# Patient Record
Sex: FEMALE | Race: WHITE | NOT HISPANIC OR LATINO | Employment: OTHER | URBAN - METROPOLITAN AREA
[De-identification: names, ages, dates, MRNs, and addresses within clinical notes are randomized per-mention and may not be internally consistent; named-entity substitution may affect disease eponyms.]

---

## 2023-10-27 DIAGNOSIS — I10 PRIMARY HYPERTENSION: Primary | ICD-10-CM

## 2023-10-27 RX ORDER — LISINOPRIL 5 MG/1
5 TABLET ORAL DAILY
COMMUNITY
Start: 2023-07-28 | End: 2024-01-09 | Stop reason: SDUPTHER

## 2023-10-27 RX ORDER — LISINOPRIL 5 MG/1
5 TABLET ORAL DAILY
Qty: 90 TABLET | Refills: 0 | Status: SHIPPED | OUTPATIENT
Start: 2023-10-27 | End: 2024-01-11 | Stop reason: WASHOUT

## 2023-12-31 NOTE — PROGRESS NOTES
Subjective   Patient ID: Samantha Wright is a 83 y.o. female who presents for No chief complaint on file..    ANGELA Castellon is here to follow up on elevated BP.  She has been on hydrochlorothiazide with complaints of urinary frequency and lisinopril with complaints of vomiting.  She was last seen 3 months ago.  She is on no BP meds.  She was instructed in a low salt diet and I asked to get a monitor at home to check her BP weekly.    Review of Systems    Objective   There were no vitals taken for this visit.    Physical Exam    Assessment/Plan   {Assess/PlanSmartLinks:[id number]}

## 2024-01-02 ENCOUNTER — APPOINTMENT (OUTPATIENT)
Dept: PRIMARY CARE | Facility: CLINIC | Age: 84
End: 2024-01-02
Payer: MEDICARE

## 2024-01-09 PROBLEM — F32.A DEPRESSION: Status: ACTIVE | Noted: 2019-02-20

## 2024-01-09 PROBLEM — I10 HYPERTENSION: Status: ACTIVE | Noted: 2023-03-03

## 2024-01-09 PROBLEM — E78.00 HYPERCHOLESTEROLEMIA: Status: ACTIVE | Noted: 2018-11-27

## 2024-01-09 PROBLEM — R73.01 IMPAIRED FASTING BLOOD SUGAR: Status: ACTIVE | Noted: 2020-03-27

## 2024-01-09 RX ORDER — CHOLECALCIFEROL (VITAMIN D3) 25 MCG
1000 TABLET ORAL DAILY
COMMUNITY
Start: 2017-11-29

## 2024-01-09 RX ORDER — LANOLIN ALCOHOL/MO/W.PET/CERES
2000 CREAM (GRAM) TOPICAL DAILY
COMMUNITY
Start: 2017-11-29

## 2024-01-09 RX ORDER — FOLIC ACID 1 MG/1
1 TABLET ORAL DAILY
COMMUNITY
Start: 2019-02-21

## 2024-01-09 RX ORDER — CALC/MAG/B COMPLEX/D3/HERB 61
15 TABLET ORAL
COMMUNITY
Start: 2019-02-21

## 2024-01-09 RX ORDER — PAROXETINE HYDROCHLORIDE HEMIHYDRATE 25 MG/1
25 TABLET, FILM COATED, EXTENDED RELEASE ORAL DAILY
COMMUNITY
Start: 2016-11-04

## 2024-01-09 NOTE — PROGRESS NOTES
Subjective   Patient ID: Samantha Wright is a 83 y.o. female who presents for Follow-up (Hypertension check).    HPI   # 1. Cande is here to follow-up on hypertension.  She was started on hydrochlorothiazide last year which she stopped on her own because of urinary frequency.  She was then changed to lisinopril which she stopped because she felt it caused vomiting.  She is currently on no antihypertensives.  She has been asked to check her blood pressure at home but declines.  Weight is down 4 pounds. She has not been exercising but plans to start doing that.  # 2. She is also here to follow-up on impaired fasting blood sugar. Her A1c in August was 6.2  # 3. She has hypercholesterolemia. She has an intolerance to statins and has declined further treatment.  She has a coronary calcium score which is under 100.  No predraw was done for this appointment.    Review of Systems  She denies headaches or dizziness.  No recent illness.  No chest tightness, orthopnea or pedal edema.  She denies shortness of breath or dyspnea on exertion.  No cough.  No numbness or tingling.    Objective   /82   Pulse 88   Wt 73.5 kg (162 lb)   SpO2 93%   BMI 31.38 kg/m²     Physical Exam  She is alert and in no acute distress.  Neck is supple without JVD.  Lungs are clear to auscultation.  Heart is regular in rhythm and rate.  Normal S1 and S2.  Remedies are without edema.    Assessment/Plan   Diagnoses and all orders for this visit:  Primary hypertension  Impaired fasting blood sugar  Hypercholesterolemia  Other orders  -     Follow Up In Primary Care - Medicare Annual; Future  Diet and activity was discussed.  I recommend regular exercise such as walking for 20 to 30 minutes on most days.  She should avoid salt.  She should follow-up with me in 6 months for a physical or as needed sooner.

## 2024-01-11 ENCOUNTER — TELEPHONE (OUTPATIENT)
Dept: PRIMARY CARE | Facility: CLINIC | Age: 84
End: 2024-01-11

## 2024-01-11 ENCOUNTER — OFFICE VISIT (OUTPATIENT)
Dept: PRIMARY CARE | Facility: CLINIC | Age: 84
End: 2024-01-11
Payer: MEDICARE

## 2024-01-11 VITALS
SYSTOLIC BLOOD PRESSURE: 134 MMHG | HEART RATE: 88 BPM | DIASTOLIC BLOOD PRESSURE: 82 MMHG | OXYGEN SATURATION: 93 % | BODY MASS INDEX: 31.38 KG/M2 | WEIGHT: 162 LBS

## 2024-01-11 DIAGNOSIS — R73.01 IMPAIRED FASTING BLOOD SUGAR: ICD-10-CM

## 2024-01-11 DIAGNOSIS — E78.00 HYPERCHOLESTEROLEMIA: ICD-10-CM

## 2024-01-11 DIAGNOSIS — I10 PRIMARY HYPERTENSION: Primary | ICD-10-CM

## 2024-01-11 PROCEDURE — 1159F MED LIST DOCD IN RCRD: CPT | Performed by: FAMILY MEDICINE

## 2024-01-11 PROCEDURE — 3075F SYST BP GE 130 - 139MM HG: CPT | Performed by: FAMILY MEDICINE

## 2024-01-11 PROCEDURE — 3079F DIAST BP 80-89 MM HG: CPT | Performed by: FAMILY MEDICINE

## 2024-01-11 PROCEDURE — 1036F TOBACCO NON-USER: CPT | Performed by: FAMILY MEDICINE

## 2024-01-11 PROCEDURE — 99214 OFFICE O/P EST MOD 30 MIN: CPT | Performed by: FAMILY MEDICINE

## 2024-01-11 PROCEDURE — 1126F AMNT PAIN NOTED NONE PRSNT: CPT | Performed by: FAMILY MEDICINE

## 2024-01-11 ASSESSMENT — PAIN SCALES - GENERAL: PAINLEVEL: 0-NO PAIN

## 2024-01-11 ASSESSMENT — PATIENT HEALTH QUESTIONNAIRE - PHQ9
SUM OF ALL RESPONSES TO PHQ9 QUESTIONS 1 AND 2: 0
1. LITTLE INTEREST OR PLEASURE IN DOING THINGS: NOT AT ALL
2. FEELING DOWN, DEPRESSED OR HOPELESS: NOT AT ALL

## 2024-07-11 ENCOUNTER — APPOINTMENT (OUTPATIENT)
Dept: PRIMARY CARE | Facility: CLINIC | Age: 84
End: 2024-07-11
Payer: MEDICARE

## 2024-07-29 ENCOUNTER — TELEPHONE (OUTPATIENT)
Dept: PRIMARY CARE | Facility: CLINIC | Age: 84
End: 2024-07-29
Payer: MEDICARE

## 2024-07-29 DIAGNOSIS — F32.A DEPRESSION, UNSPECIFIED DEPRESSION TYPE: ICD-10-CM

## 2024-07-29 RX ORDER — PAROXETINE HYDROCHLORIDE HEMIHYDRATE 25 MG/1
25 TABLET, FILM COATED, EXTENDED RELEASE ORAL DAILY
Qty: 90 TABLET | Refills: 1 | OUTPATIENT
Start: 2024-07-29

## 2024-07-29 NOTE — TELEPHONE ENCOUNTER
Patient came in to request a refill for Paroxetine 25 mg -  she is currently out of the medication and needs it refilled today.  Pharmacy: [facility] - [address]  Patient phone #: [phone]

## 2024-07-30 ENCOUNTER — TELEPHONE (OUTPATIENT)
Dept: PRIMARY CARE | Facility: CLINIC | Age: 84
End: 2024-07-30
Payer: MEDICARE

## 2024-07-30 RX ORDER — PAROXETINE HYDROCHLORIDE HEMIHYDRATE 25 MG/1
25 TABLET, FILM COATED, EXTENDED RELEASE ORAL DAILY
Qty: 90 TABLET | Refills: 0 | OUTPATIENT
Start: 2024-07-30

## 2024-07-30 NOTE — PROGRESS NOTES
Subjective   Reason for Visit: Samantha Wrgiht is an 84 y.o. female here for a Medicare Wellness visit.     Past Medical, Surgical, and Family History reviewed and updated in chart.    Reviewed all medications by prescribing practitioner or clinical pharmacist (such as prescriptions, OTCs, herbal therapies and supplements) and documented in the medical record.    HPI    Patient Care Team:  Martine Goodman MD as PCP - General (Family Medicine)  Martine Goodman MD as PCP - [employer]age PCP  Martine Goodman MD as Primary Care Provider       # 1. SAMANTHA is seen for for her comprehensive physical exam. She feels well. Has good friends and family and is involved in her [employer]. She is a .    She has had the Shingrix vaccine. She has had a coronary calcium score of 26.   She is due for a mammogram.    She had a negative Cologuard in 2020.  She is part of a study on macular degeneration through her ophthalmologist.   This will end in 2025.  Consultants:  1. Dr. Kang for Rheumatoid arthritis and osteoporosis.  2. dermatologist for skin cancer.     # 2. SAMANTHA is seen today for follow-up of Hypercholesterolemia. She gets myalgias with statins and has elected to not treat the elevated cholesterol despite the risk of heart disease.   # 3. SAMANTHA is seen today also for follow up of depression. The depression is controlled. She claims the Paxil is effective.  # 4.   She has hypertension which was diagnosed a couple years ago.  Hydrochlorothiazide caused urinary frequency and lisinopril caused her to feel ill.  She is currently on no medications. She has been instructed in a low-salt diet.   # 5.   She is also here to follow-up on impaired fasting blood sugar.    Her fasting blood sugar is normal and her A1c is 5.8.   She has tried to cut down on her sweets.      Review of Systems  Constitutional Symptoms: She is negative for headaches, sleep disturbance. She denies fatigue or change in  "appetite.  Cardiovascular: She is negative for chest pain, orthopnea, edema, claudication.   Respiratory: She is negative for shortness of breath, dyspnea on exertion or cough.   Gastrointestinal: She is negative for indigestion, abdominal pain, change in bowel habits, blood in stool.   Female Genitourinary: She is positive for nocturia - (once). She is negative for incontinence, frequency or dysuria.  Musculoskeletal: She is negative for joint swelling, myalgias, cramps.   Integumentary: She is negative for skin trouble or rash, non-healing skin lesion.   Neurological: She is negative for weakness, balance problems.   Psychiatric: She is negative for depression, anxiety.   Endocrine: She is negative for weight gain, polyuria, polydipsia.     Objective   Vitals:  /78 (BP Location: Left arm)   Pulse 66   Ht 1.53 m (5' 0.24\")   Wt 75.8 kg (167 lb)   SpO2 95%   BMI 32.36 kg/m²       Physical Exam  General Appearance: ANILA is awake and alert  Eyes: PERRLA, EOMI, no scleral icterus. Vision appears normal in both eyes.  Ears, Nose, Mouth, Throat:   EARS: External bilateral ears reveal normal helix, tragus and ear lobe. Both canals are normal. Tympanic membranes are clear and intact. Hearing is grossly normal   Nose is not congested. Throat is noninjected without exudate.  Neck: Inspection of the neck reveals no masses or JVD.   Inspection reveals normal thyroid gland. Palpation shows normal thyroid gland. No carotid bruits.   No cervical adenopathy.  Breasts:   No masses or tenderness.  No axillary adenopathy.  Chest: Lungs are clear to auscultation without wheezes, rales, or rhonchi.  Cardiovascular: Heart is RRR, normal S1, S2. No murmurs or gallops. Peripheral pulses are intact. Extremities: No edema  Abdomen: soft, nontender, no organomegaly or masses.   Lymph Nodes: Palpation of the inguinal area are within normal limit.  Musculoskeletal: Extremities: Full Range of motion and strength throughout. Both feet " have no evidence of lesions and nails are in good repair.  Skin: Skin has no bruising, rash, eruptions, or abnormal appearing lesions. No lesions on the feet.   Neurological: Intact and non-focal. Cranial nerves II - XII are grossly intact. Motor exams reveals normal tone and strength ,  Psychiatric: Patient's mood is normal with good eye contact. Affect is appropriate.       Assessment/Plan   Problem List Items Addressed This Visit             ICD-10-CM    Depression F32.A    Relevant Medications    PARoxetine CR (Paxil-CR) 25 mg 24 hr tablet    Hypercholesterolemia E78.00    Hypertension I10    Impaired fasting blood sugar R73.01     Other Visit Diagnoses         Codes    Routine general medical examination at health care facility    -  Primary Z00.00    Breast cancer screening by mammogram     Z12.31    Relevant Orders    BI mammo bilateral screening tomosynthesis    Post-menopausal     Z78.0          Her Paxil was refilled for 1 year.  Diet and activity as discussed.  She will schedule her mammogram at her convenience.  She should follow-up in 1 year or as needed sooner.

## 2024-07-31 ENCOUNTER — LAB (OUTPATIENT)
Dept: LAB | Facility: LAB | Age: 84
End: 2024-07-31
Payer: MEDICARE

## 2024-07-31 DIAGNOSIS — R73.01 IMPAIRED FASTING BLOOD SUGAR: ICD-10-CM

## 2024-07-31 DIAGNOSIS — I10 PRIMARY HYPERTENSION: ICD-10-CM

## 2024-07-31 DIAGNOSIS — E78.00 HYPERCHOLESTEROLEMIA: ICD-10-CM

## 2024-07-31 PROCEDURE — 81001 URINALYSIS AUTO W/SCOPE: CPT

## 2024-07-31 PROCEDURE — 85025 COMPLETE CBC W/AUTO DIFF WBC: CPT

## 2024-07-31 PROCEDURE — 83036 HEMOGLOBIN GLYCOSYLATED A1C: CPT

## 2024-07-31 PROCEDURE — 36415 COLL VENOUS BLD VENIPUNCTURE: CPT

## 2024-07-31 PROCEDURE — 80061 LIPID PANEL: CPT

## 2024-07-31 PROCEDURE — 80053 COMPREHEN METABOLIC PANEL: CPT

## 2024-08-01 LAB
ALBUMIN SERPL BCP-MCNC: 4.2 G/DL (ref 3.4–5)
ALP SERPL-CCNC: 59 U/L (ref 33–136)
ALT SERPL W P-5'-P-CCNC: 20 U/L (ref 7–45)
ANION GAP SERPL CALC-SCNC: 12 MMOL/L (ref 10–20)
APPEARANCE UR: CLEAR
AST SERPL W P-5'-P-CCNC: 18 U/L (ref 9–39)
BASOPHILS # BLD AUTO: 0.05 X10*3/UL (ref 0–0.1)
BASOPHILS NFR BLD AUTO: 0.9 %
BILIRUB SERPL-MCNC: 0.6 MG/DL (ref 0–1.2)
BILIRUB UR STRIP.AUTO-MCNC: NEGATIVE MG/DL
BUN SERPL-MCNC: 15 MG/DL (ref 6–23)
CALCIUM SERPL-MCNC: 9.3 MG/DL (ref 8.6–10.6)
CHLORIDE SERPL-SCNC: 104 MMOL/L (ref 98–107)
CHOLEST SERPL-MCNC: 264 MG/DL (ref 0–199)
CHOLESTEROL/HDL RATIO: 4.9
CO2 SERPL-SCNC: 29 MMOL/L (ref 21–32)
COLOR UR: ABNORMAL
CREAT SERPL-MCNC: 0.61 MG/DL (ref 0.5–1.05)
EGFRCR SERPLBLD CKD-EPI 2021: 89 ML/MIN/1.73M*2
EOSINOPHIL # BLD AUTO: 0.19 X10*3/UL (ref 0–0.4)
EOSINOPHIL NFR BLD AUTO: 3.2 %
ERYTHROCYTE [DISTWIDTH] IN BLOOD BY AUTOMATED COUNT: 15.7 % (ref 11.5–14.5)
EST. AVERAGE GLUCOSE BLD GHB EST-MCNC: 120 MG/DL
GLUCOSE SERPL-MCNC: 96 MG/DL (ref 74–99)
GLUCOSE UR STRIP.AUTO-MCNC: NORMAL MG/DL
HBA1C MFR BLD: 5.8 %
HCT VFR BLD AUTO: 43.3 % (ref 36–46)
HDLC SERPL-MCNC: 54.4 MG/DL
HGB BLD-MCNC: 13.7 G/DL (ref 12–16)
IMM GRANULOCYTES # BLD AUTO: 0.01 X10*3/UL (ref 0–0.5)
IMM GRANULOCYTES NFR BLD AUTO: 0.2 % (ref 0–0.9)
KETONES UR STRIP.AUTO-MCNC: NEGATIVE MG/DL
LDLC SERPL CALC-MCNC: 175 MG/DL
LEUKOCYTE ESTERASE UR QL STRIP.AUTO: ABNORMAL
LYMPHOCYTES # BLD AUTO: 1.47 X10*3/UL (ref 0.8–3)
LYMPHOCYTES NFR BLD AUTO: 25 %
MCH RBC QN AUTO: 30.5 PG (ref 26–34)
MCHC RBC AUTO-ENTMCNC: 31.6 G/DL (ref 32–36)
MCV RBC AUTO: 96 FL (ref 80–100)
MONOCYTES # BLD AUTO: 0.75 X10*3/UL (ref 0.05–0.8)
MONOCYTES NFR BLD AUTO: 12.8 %
MUCOUS THREADS #/AREA URNS AUTO: NORMAL /LPF
NEUTROPHILS # BLD AUTO: 3.41 X10*3/UL (ref 1.6–5.5)
NEUTROPHILS NFR BLD AUTO: 57.9 %
NITRITE UR QL STRIP.AUTO: NEGATIVE
NON HDL CHOLESTEROL: 210 MG/DL (ref 0–149)
NRBC BLD-RTO: 0 /100 WBCS (ref 0–0)
PH UR STRIP.AUTO: 5.5 [PH]
PLATELET # BLD AUTO: 235 X10*3/UL (ref 150–450)
POTASSIUM SERPL-SCNC: 4.6 MMOL/L (ref 3.5–5.3)
PROT SERPL-MCNC: 6.6 G/DL (ref 6.4–8.2)
PROT UR STRIP.AUTO-MCNC: NEGATIVE MG/DL
RBC # BLD AUTO: 4.49 X10*6/UL (ref 4–5.2)
RBC # UR STRIP.AUTO: NEGATIVE /UL
RBC #/AREA URNS AUTO: NORMAL /HPF
SODIUM SERPL-SCNC: 140 MMOL/L (ref 136–145)
SP GR UR STRIP.AUTO: 1.01
SQUAMOUS #/AREA URNS AUTO: NORMAL /HPF
TRIGL SERPL-MCNC: 171 MG/DL (ref 0–149)
UROBILINOGEN UR STRIP.AUTO-MCNC: NORMAL MG/DL
VLDL: 34 MG/DL (ref 0–40)
WBC # BLD AUTO: 5.9 X10*3/UL (ref 4.4–11.3)
WBC #/AREA URNS AUTO: NORMAL /HPF

## 2024-08-02 ENCOUNTER — APPOINTMENT (OUTPATIENT)
Dept: PRIMARY CARE | Facility: CLINIC | Age: 84
End: 2024-08-02
Payer: MEDICARE

## 2024-08-02 ENCOUNTER — TELEPHONE (OUTPATIENT)
Dept: PRIMARY CARE | Facility: CLINIC | Age: 84
End: 2024-08-02

## 2024-08-02 VITALS
OXYGEN SATURATION: 95 % | HEIGHT: 60 IN | HEART RATE: 66 BPM | WEIGHT: 167 LBS | DIASTOLIC BLOOD PRESSURE: 78 MMHG | SYSTOLIC BLOOD PRESSURE: 134 MMHG | BODY MASS INDEX: 32.79 KG/M2

## 2024-08-02 DIAGNOSIS — E78.00 HYPERCHOLESTEROLEMIA: ICD-10-CM

## 2024-08-02 DIAGNOSIS — R73.01 IMPAIRED FASTING BLOOD SUGAR: ICD-10-CM

## 2024-08-02 DIAGNOSIS — I10 PRIMARY HYPERTENSION: ICD-10-CM

## 2024-08-02 DIAGNOSIS — Z00.00 ROUTINE GENERAL MEDICAL EXAMINATION AT HEALTH CARE FACILITY: ICD-10-CM

## 2024-08-02 DIAGNOSIS — Z00.00 ROUTINE GENERAL MEDICAL EXAMINATION AT HEALTH CARE FACILITY: Primary | ICD-10-CM

## 2024-08-02 DIAGNOSIS — Z12.31 BREAST CANCER SCREENING BY MAMMOGRAM: ICD-10-CM

## 2024-08-02 DIAGNOSIS — Z78.0 POST-MENOPAUSAL: ICD-10-CM

## 2024-08-02 DIAGNOSIS — F34.1 PERSISTENT DEPRESSIVE DISORDER: ICD-10-CM

## 2024-08-02 PROCEDURE — 3078F DIAST BP <80 MM HG: CPT | Performed by: FAMILY MEDICINE

## 2024-08-02 PROCEDURE — 1170F FXNL STATUS ASSESSED: CPT | Performed by: FAMILY MEDICINE

## 2024-08-02 PROCEDURE — 99212 OFFICE O/P EST SF 10 MIN: CPT | Performed by: FAMILY MEDICINE

## 2024-08-02 PROCEDURE — 3075F SYST BP GE 130 - 139MM HG: CPT | Performed by: FAMILY MEDICINE

## 2024-08-02 PROCEDURE — G0439 PPPS, SUBSEQ VISIT: HCPCS | Performed by: FAMILY MEDICINE

## 2024-08-02 PROCEDURE — 1126F AMNT PAIN NOTED NONE PRSNT: CPT | Performed by: FAMILY MEDICINE

## 2024-08-02 PROCEDURE — 1160F RVW MEDS BY RX/DR IN RCRD: CPT | Performed by: FAMILY MEDICINE

## 2024-08-02 PROCEDURE — 1036F TOBACCO NON-USER: CPT | Performed by: FAMILY MEDICINE

## 2024-08-02 PROCEDURE — 1159F MED LIST DOCD IN RCRD: CPT | Performed by: FAMILY MEDICINE

## 2024-08-02 RX ORDER — PAROXETINE HYDROCHLORIDE HEMIHYDRATE 25 MG/1
25 TABLET, FILM COATED, EXTENDED RELEASE ORAL DAILY
Qty: 90 TABLET | Refills: 3 | Status: SHIPPED | OUTPATIENT
Start: 2024-08-02 | End: 2025-08-02

## 2024-08-02 ASSESSMENT — ACTIVITIES OF DAILY LIVING (ADL)
MANAGING_FINANCES: INDEPENDENT
DRESSING: INDEPENDENT
BATHING: INDEPENDENT
DOING_HOUSEWORK: INDEPENDENT
GROCERY_SHOPPING: INDEPENDENT
TAKING_MEDICATION: INDEPENDENT

## 2024-08-02 ASSESSMENT — PATIENT HEALTH QUESTIONNAIRE - PHQ9
2. FEELING DOWN, DEPRESSED OR HOPELESS: NOT AT ALL
SUM OF ALL RESPONSES TO PHQ9 QUESTIONS 1 AND 2: 0
1. LITTLE INTEREST OR PLEASURE IN DOING THINGS: NOT AT ALL
2. FEELING DOWN, DEPRESSED OR HOPELESS: NOT AT ALL
SUM OF ALL RESPONSES TO PHQ9 QUESTIONS 1 AND 2: 0
1. LITTLE INTEREST OR PLEASURE IN DOING THINGS: NOT AT ALL

## 2024-08-02 ASSESSMENT — PAIN SCALES - GENERAL: PAINLEVEL: 0-NO PAIN

## 2024-08-02 NOTE — PATIENT INSTRUCTIONS
It has been a pleasure to take care of you all these years -  I wish you continued good health!   Continue your socializing and playing bridge!   Your prescription should be ready at the pharmacy today.  You may schedule your mammogram at your convenience.

## 2024-08-28 ENCOUNTER — HOSPITAL ENCOUNTER (OUTPATIENT)
Dept: RADIOLOGY | Facility: HOSPITAL | Age: 84
Discharge: HOME | End: 2024-08-28
Payer: MEDICARE

## 2024-08-28 VITALS — BODY MASS INDEX: 32.39 KG/M2 | HEIGHT: 60 IN | WEIGHT: 165 LBS

## 2024-08-28 DIAGNOSIS — Z12.31 BREAST CANCER SCREENING BY MAMMOGRAM: ICD-10-CM

## 2024-08-28 PROCEDURE — 77067 SCR MAMMO BI INCL CAD: CPT

## 2025-03-04 NOTE — PROGRESS NOTES
"Subjective   Patient ID: Samantha Wright is a 84 y.o. female who presents for Cough (Runny nose, SOB, fatigue, decreased appetite. X 3 wks ).    HPI   Cande presents with cold type symptoms.  She is having rhinorrhea, fatigue, shortness of breath and decreased appetite for 3 weeks.  Her symptoms seem to get better after about 10 days but now they are back again.  She is blowing her nose a lot.  She is eating but feels that her appetite is decreased.  She is not having any chest tightness.  No documented fever.  She has been taking DayQuil and Advil.  She does not think she had a flu vaccine this year.    Review of Systems  Is started with a sore throat but that is not a current symptom.  She is having no difficulty swallowing.  She denies nausea or vomiting.    Objective   /80 (BP Location: Left arm, Patient Position: Sitting, BP Cuff Size: Large adult)   Pulse 69   Temp 36.2 °C (97.1 °F) (Temporal)   Ht 1.651 m (5' 5\")   Wt 74.4 kg (164 lb)   SpO2 94%   BMI 27.29 kg/m²     Physical Exam  She is alert and comfortable.  No respiratory distress.  Ears TMs are clear.  Nose is congested with thick rhinorrhea.  Her turbinates are erythematous.  Throat is noninjected and without exudate.  She has postnasal drainage.  Neck is supple without adenopathy.  Lungs are clear to auscultation.  Skin turgor is normal and mucous membranes are moist.    Assessment/Plan   Assessment & Plan  Acute non-recurrent sinusitis, unspecified location    Orders:    amoxicillin (Amoxil) 875 mg tablet; Take 1 tablet (875 mg) by mouth 2 times a day for 10 days.    I recommend over-the-counter Mucinex 12-hour twice daily.  She should increase her intake of clear liquids.       "

## 2025-03-06 ENCOUNTER — OFFICE VISIT (OUTPATIENT)
Dept: PRIMARY CARE | Facility: CLINIC | Age: 85
End: 2025-03-06
Payer: MEDICARE

## 2025-03-06 VITALS
SYSTOLIC BLOOD PRESSURE: 138 MMHG | HEART RATE: 69 BPM | BODY MASS INDEX: 27.32 KG/M2 | HEIGHT: 65 IN | TEMPERATURE: 97.1 F | OXYGEN SATURATION: 94 % | DIASTOLIC BLOOD PRESSURE: 80 MMHG | WEIGHT: 164 LBS

## 2025-03-06 DIAGNOSIS — J01.90 ACUTE NON-RECURRENT SINUSITIS, UNSPECIFIED LOCATION: Primary | ICD-10-CM

## 2025-03-06 PROCEDURE — 1159F MED LIST DOCD IN RCRD: CPT | Performed by: FAMILY MEDICINE

## 2025-03-06 PROCEDURE — 99213 OFFICE O/P EST LOW 20 MIN: CPT | Performed by: FAMILY MEDICINE

## 2025-03-06 PROCEDURE — 3079F DIAST BP 80-89 MM HG: CPT | Performed by: FAMILY MEDICINE

## 2025-03-06 PROCEDURE — 1036F TOBACCO NON-USER: CPT | Performed by: FAMILY MEDICINE

## 2025-03-06 PROCEDURE — 1126F AMNT PAIN NOTED NONE PRSNT: CPT | Performed by: FAMILY MEDICINE

## 2025-03-06 PROCEDURE — 1160F RVW MEDS BY RX/DR IN RCRD: CPT | Performed by: FAMILY MEDICINE

## 2025-03-06 PROCEDURE — 3075F SYST BP GE 130 - 139MM HG: CPT | Performed by: FAMILY MEDICINE

## 2025-03-06 RX ORDER — AMOXICILLIN 875 MG/1
875 TABLET, FILM COATED ORAL 2 TIMES DAILY
Qty: 20 TABLET | Refills: 0 | Status: SHIPPED | OUTPATIENT
Start: 2025-03-06 | End: 2025-03-16

## 2025-03-06 ASSESSMENT — PATIENT HEALTH QUESTIONNAIRE - PHQ9
2. FEELING DOWN, DEPRESSED OR HOPELESS: NOT AT ALL
1. LITTLE INTEREST OR PLEASURE IN DOING THINGS: NOT AT ALL
SUM OF ALL RESPONSES TO PHQ9 QUESTIONS 1 AND 2: 0

## 2025-03-06 ASSESSMENT — PAIN SCALES - GENERAL: PAINLEVEL_OUTOF10: 0-NO PAIN

## 2025-04-07 ENCOUNTER — HOSPITAL ENCOUNTER (OUTPATIENT)
Dept: RADIOLOGY | Facility: CLINIC | Age: 85
Discharge: HOME | End: 2025-04-07
Payer: MEDICARE

## 2025-04-07 DIAGNOSIS — M81.0 AGE-RELATED OSTEOPOROSIS WITHOUT CURRENT PATHOLOGICAL FRACTURE: ICD-10-CM

## 2025-04-07 PROCEDURE — 77085 DXA BONE DENSITY AXL VRT FX: CPT

## 2025-04-07 PROCEDURE — 77086 VRT FRACTURE ASSMT VIA DXA: CPT | Performed by: RADIOLOGY

## 2025-05-21 ENCOUNTER — APPOINTMENT (OUTPATIENT)
Dept: AUDIOLOGY | Facility: CLINIC | Age: 85
End: 2025-05-21
Payer: MEDICARE

## 2025-05-21 ENCOUNTER — APPOINTMENT (OUTPATIENT)
Dept: OTOLARYNGOLOGY | Facility: CLINIC | Age: 85
End: 2025-05-21
Payer: MEDICARE

## 2025-05-21 VITALS — TEMPERATURE: 97.5 F | HEIGHT: 65 IN | BODY MASS INDEX: 27.66 KG/M2 | WEIGHT: 166 LBS

## 2025-05-21 DIAGNOSIS — H61.23 BILATERAL IMPACTED CERUMEN: Primary | ICD-10-CM

## 2025-05-21 DIAGNOSIS — H91.90 SUBJECTIVE HEARING CHANGE: ICD-10-CM

## 2025-05-21 DIAGNOSIS — H90.3 SENSORINEURAL HEARING LOSS (SNHL) OF BOTH EARS: Primary | ICD-10-CM

## 2025-05-21 PROCEDURE — 99203 OFFICE O/P NEW LOW 30 MIN: CPT

## 2025-05-21 PROCEDURE — 1159F MED LIST DOCD IN RCRD: CPT

## 2025-05-21 PROCEDURE — 92550 TYMPANOMETRY & REFLEX THRESH: CPT | Performed by: AUDIOLOGIST

## 2025-05-21 PROCEDURE — 69210 REMOVE IMPACTED EAR WAX UNI: CPT

## 2025-05-21 PROCEDURE — 92557 COMPREHENSIVE HEARING TEST: CPT | Performed by: AUDIOLOGIST

## 2025-05-21 RX ORDER — METHOTREXATE 2.5 MG/1
TABLET ORAL WEEKLY
COMMUNITY
Start: 2024-06-24

## 2025-05-21 NOTE — PROGRESS NOTES
"  AUDIOLOGY ADULT AUDIOMETRIC EVALUATION    Name:  Samantha Wright  :  1940  Age:  85 y.o.  Date of Evaluation:  May 21, 2025    Reason for visit: Samantha is seen in the clinic today at the request of otolaryngology for an audiologic evaluation.     HISTORY  Patient seen post cerumen removal.   She reports she is hearing \"better\"; however, she has noticed a gradual decrease in hearing; difficulty hearing/understanding others.       EVALUATION  See scanned audiogram: “Media” > “Audiology Report”.      RESULTS  Otoscopic Evaluation:  Right Ear: clear ear canal  Left Ear: clear ear canal    Immittance Measures:  Tympanometry:  Right Ear: Type A, normal tympanic membrane mobility with normal middle ear pressure  Left Ear: Type A, normal tympanic membrane mobility with normal middle ear pressure    Acoustic Reflexes:  Ipsilateral Right Ear: Acoustic reflexes present within normal limits 500 Hz through 4000 Hz   Ipsilateral Left Ear: Acoustic reflexes present within normal limits 500 Hz through 4000 Hz   Contralateral Right Ear: did not evaluate  Contralateral Left Ear: did not evaluate    Distortion Product Otoacoustic Emissions (DPOAEs):  Right Ear: Refer at all frequencies 1KHz-8KHz   Left Ear: Refer at all frequencies 1KHz-8KHz     Audiometry:  Test Technique and Reliability:   Standard audiometry via supra-aural headphones. Reliability is good.    Pure tone air and bone conduction audiometry:  Mild to moderately severe sensorineural hearing loss bilaterally    Speech Audiometry (Word Recognition Scores):   Right Ear: Excellent at most comfortable level of loudness   Left ear: Excellent at most comfortable level of loudness     Impressions: Mild to moderately severe sensorineural hearing loss bilaterally    Recommendations:   1) Follow up with otolaryngology as needed for cerumen management; will forward audiogram  2) Audiogram annually or sooner if needed  3) Hearing aid evaluation    Linda Yip M.A., " CCC/A

## 2025-05-22 NOTE — PROGRESS NOTES
"Chief Complaint   Patient presents with    New Patient Visit     LOV 2/2020 DR. BANERJEE EAR CLEANING NO WATER, THEN AUDIO     HPI:  Samantha Wright is a 85 y.o. female presents with for consultation with complaints of difficulty hearing which has been bothering her for several months.  Reports most difficulty with background noises.  She is checking to see if she needs her ears cleaned before her audiogram today.  Denies tinnitus, otalgia or otorrhea.  Reports mother had hearing trouble and a brother who had Alport syndrome.  She is interested in hearing aids.    PMH:  Medical History[1]  Surgical History[2]      Medications:   Current Medications[3]     Allergies:  Allergies[4]     ROS:  Review of systems normal unless stated otherwise in the HPI and/or PMH.    Physical Exam:  Temperature 36.4 °C (97.5 °F), height 1.651 m (5' 5\"), weight 75.3 kg (166 lb). Body mass index is 27.62 kg/m².     GENERAL APPEARANCE: Well developed and well nourished.  Alert and oriented in no acute distress.  Normal vocal quality.      HEAD/FACE: No erythema or edema or facial tenderness.  Normal facial nerve function bilaterally.    EAR:       EXTERNAL: Bilateral external auditory canals noted to have cerumen impactions which caused obstruction of visualization of the tympanic membranes.  These were removed via procedure under direct microscopic visualization using instrumentation without any difficulty or complication.  Normal pinnas and external auditory canals without lesion.       MIDDLE EAR: Tympanic membranes intact and mobile with normal landmarks.  Middle ear space appears well aerated.       TUBE STATUS: N/A       MASTOID CAVITY: N/A       HEARING: Gross hearing assessment is within normal limits.      NOSE:       VISUALIZED USING: Anterior rhinoscopy with headlight and nasal speculum.       DORSUM: Midline, nontraumatic appearance.       MUCOSA: Normal-appearing.       SECRETIONS: Normal.       SEPTUM: Midline and " nonobstructing.       INFERIOR TURBINATES: Normal.       MIDDLE TURBINATES/MEATUS: N/A       BLEEDING: N/A         ORAL CAVITY/PHARYNX:       TEETH: Adequate dentition.       TONGUE: No mass or lesion.  Normal mobility.       FLOOR OF MOUTH: No mass or lesion.       PALATE: Normal hard palate, soft palate, and uvula.       OROPHARYNX: Normal without mass or lesion.       BUCCAL MUCOSA/GBS: Normal without mass or lesion.       LIPS: Normal.    LARYNX/HYPOPHARYNX/NASOPHARYNX: N/A    NECK: No palpable masses or abnormal adenopathy.  Trachea is midline.    THYROID: No thyromegaly or palpable nodule.    SALIVARY GLANDS: Normal bilateral parotid and submandibular glands by inspection and palpation.    TMJ's: Normal.    NEURO: Cranial nerve exam grossly normal bilaterally.       Assessment/Plan   Samantha was seen today for new patient visit.  Diagnoses and all orders for this visit:  Bilateral impacted cerumen (Primary)  Subjective hearing change  Samantha tolerated ear cleaning well.  We discussed using hearing protection with noise exposure and good communication techniques.  She will get an audiogram and return as needed.     No follow-ups on file.     Zuleima Thomson, NORI-CNP         [1]   Past Medical History:  Diagnosis Date    Anxiety     Depression    [2]   Past Surgical History:  Procedure Laterality Date    APPENDECTOMY      COLONOSCOPY  01/01/2006    LEG SURGERY Right 08/2023    ghassan inserted   [3]   Current Outpatient Medications:     cholecalciferol (Vitamin D-3) 25 MCG (1000 UT) tablet, Take 1 tablet (1,000 Units) by mouth once daily., Disp: , Rfl:     cyanocobalamin (Vitamin B-12) 1,000 mcg tablet, Take 2 tablets (2,000 mcg) by mouth once daily., Disp: , Rfl:     denosumab (PROLIA SUBQ), Inject 60 mg as directed every 6 months., Disp: , Rfl:     folic acid (Folvite) 1 mg tablet, Take 1 tablet (1 mg) by mouth once daily., Disp: , Rfl:     lansoprazole (Prevacid) 15 mg DR capsule, Take 1 capsule (15 mg) by mouth  once daily in the morning. Take before meals., Disp: , Rfl:     methotrexate (Trexall) 2.5 mg tablet, Take by mouth 1 (one) time per week., Disp: , Rfl:     PARoxetine CR (Paxil-CR) 25 mg 24 hr tablet, Take 1 tablet (25 mg) by mouth once daily., Disp: 90 tablet, Rfl: 3  [4]   Allergies  Allergen Reactions    Statins-Hmg-Coa Reductase Inhibitors Unknown     dizziness

## 2025-06-09 DIAGNOSIS — F34.1 PERSISTENT DEPRESSIVE DISORDER: ICD-10-CM

## 2025-06-09 RX ORDER — PAROXETINE HYDROCHLORIDE HEMIHYDRATE 25 MG/1
25 TABLET, FILM COATED, EXTENDED RELEASE ORAL DAILY
Qty: 90 TABLET | Refills: 0 | Status: SHIPPED | OUTPATIENT
Start: 2025-06-09

## 2025-08-05 ENCOUNTER — APPOINTMENT (OUTPATIENT)
Dept: PRIMARY CARE | Facility: CLINIC | Age: 85
End: 2025-08-05
Payer: MEDICARE

## 2025-09-02 ASSESSMENT — ENCOUNTER SYMPTOMS
VOMITING: 0
HEADACHES: 0
DYSPHORIC MOOD: 0
MYALGIAS: 0
SORE THROAT: 0
NUMBNESS: 0
SHORTNESS OF BREATH: 0
TROUBLE SWALLOWING: 0
ARTHRALGIAS: 0
NAUSEA: 0
WEAKNESS: 0
ABDOMINAL PAIN: 0
HEMATURIA: 0
COUGH: 0
RHINORRHEA: 0
CHILLS: 0
DYSURIA: 0
BLOOD IN STOOL: 0
FEVER: 0
UNEXPECTED WEIGHT CHANGE: 0

## 2025-09-03 ENCOUNTER — APPOINTMENT (OUTPATIENT)
Dept: PRIMARY CARE | Facility: CLINIC | Age: 85
End: 2025-09-03
Payer: MEDICARE

## 2025-09-03 PROBLEM — S82.409A CLOSED FRACTURE OF FIBULA: Status: ACTIVE | Noted: 2022-10-07

## 2025-09-03 PROBLEM — F41.9 ANXIETY DISORDER: Status: ACTIVE | Noted: 2022-10-07

## 2025-09-03 PROBLEM — M89.9 DISORDER OF BONE: Status: ACTIVE | Noted: 2022-10-07

## 2025-09-03 PROBLEM — Z90.89 ACQUIRED ABSENCE OF ORGAN: Status: ACTIVE | Noted: 2022-10-07

## 2025-09-03 ASSESSMENT — ACTIVITIES OF DAILY LIVING (ADL)
DOING_HOUSEWORK: INDEPENDENT
TAKING_MEDICATION: INDEPENDENT
GROCERY_SHOPPING: INDEPENDENT
DRESSING: INDEPENDENT
MANAGING_FINANCES: INDEPENDENT
BATHING: INDEPENDENT

## 2025-09-03 ASSESSMENT — PATIENT HEALTH QUESTIONNAIRE - PHQ9
4. FEELING TIRED OR HAVING LITTLE ENERGY: NEARLY EVERY DAY
3. TROUBLE FALLING OR STAYING ASLEEP OR SLEEPING TOO MUCH: NEARLY EVERY DAY
5. POOR APPETITE OR OVEREATING: NOT AT ALL
SUM OF ALL RESPONSES TO PHQ9 QUESTIONS 1 AND 2: 4
SUM OF ALL RESPONSES TO PHQ QUESTIONS 1-9: 13
9. THOUGHTS THAT YOU WOULD BE BETTER OFF DEAD, OR OF HURTING YOURSELF: NOT AT ALL
1. LITTLE INTEREST OR PLEASURE IN DOING THINGS: NEARLY EVERY DAY
6. FEELING BAD ABOUT YOURSELF - OR THAT YOU ARE A FAILURE OR HAVE LET YOURSELF OR YOUR FAMILY DOWN: NOT AT ALL
2. FEELING DOWN, DEPRESSED OR HOPELESS: NOT AT ALL
1. LITTLE INTEREST OR PLEASURE IN DOING THINGS: NOT AT ALL
SUM OF ALL RESPONSES TO PHQ9 QUESTIONS 1 AND 2: 0
2. FEELING DOWN, DEPRESSED OR HOPELESS: SEVERAL DAYS
7. TROUBLE CONCENTRATING ON THINGS, SUCH AS READING THE NEWSPAPER OR WATCHING TELEVISION: NEARLY EVERY DAY
8. MOVING OR SPEAKING SO SLOWLY THAT OTHER PEOPLE COULD HAVE NOTICED. OR THE OPPOSITE, BEING SO FIGETY OR RESTLESS THAT YOU HAVE BEEN MOVING AROUND A LOT MORE THAN USUAL: NOT AT ALL

## 2025-09-03 ASSESSMENT — ENCOUNTER SYMPTOMS: NERVOUS/ANXIOUS: 1

## 2025-09-03 ASSESSMENT — ANXIETY QUESTIONNAIRES
5. BEING SO RESTLESS THAT IT IS HARD TO SIT STILL: NOT AT ALL
3. WORRYING TOO MUCH ABOUT DIFFERENT THINGS: NOT AT ALL
1. FEELING NERVOUS, ANXIOUS, OR ON EDGE: SEVERAL DAYS
2. NOT BEING ABLE TO STOP OR CONTROL WORRYING: NOT AT ALL
6. BECOMING EASILY ANNOYED OR IRRITABLE: MORE THAN HALF THE DAYS
IF YOU CHECKED OFF ANY PROBLEMS ON THIS QUESTIONNAIRE, HOW DIFFICULT HAVE THESE PROBLEMS MADE IT FOR YOU TO DO YOUR WORK, TAKE CARE OF THINGS AT HOME, OR GET ALONG WITH OTHER PEOPLE: SOMEWHAT DIFFICULT
4. TROUBLE RELAXING: NOT AT ALL
GAD7 TOTAL SCORE: 3
7. FEELING AFRAID AS IF SOMETHING AWFUL MIGHT HAPPEN: NOT AT ALL

## 2025-09-03 ASSESSMENT — COLUMBIA-SUICIDE SEVERITY RATING SCALE - C-SSRS
2. HAVE YOU ACTUALLY HAD ANY THOUGHTS OF KILLING YOURSELF?: NO
6. HAVE YOU EVER DONE ANYTHING, STARTED TO DO ANYTHING, OR PREPARED TO DO ANYTHING TO END YOUR LIFE?: NO
1. IN THE PAST MONTH, HAVE YOU WISHED YOU WERE DEAD OR WISHED YOU COULD GO TO SLEEP AND NOT WAKE UP?: NO

## 2025-10-08 ENCOUNTER — APPOINTMENT (OUTPATIENT)
Dept: PRIMARY CARE | Facility: CLINIC | Age: 85
End: 2025-10-08
Payer: MEDICARE